# Patient Record
Sex: FEMALE | Race: WHITE | Employment: UNEMPLOYED
[De-identification: names, ages, dates, MRNs, and addresses within clinical notes are randomized per-mention and may not be internally consistent; named-entity substitution may affect disease eponyms.]

---

## 2022-09-07 ENCOUNTER — OFFICE VISIT (OUTPATIENT)
Dept: INTERNAL MEDICINE CLINIC | Facility: CLINIC | Age: 27
End: 2022-09-07

## 2022-09-07 VITALS
HEART RATE: 88 BPM | BODY MASS INDEX: 29.44 KG/M2 | SYSTOLIC BLOOD PRESSURE: 102 MMHG | OXYGEN SATURATION: 99 % | DIASTOLIC BLOOD PRESSURE: 72 MMHG | WEIGHT: 160 LBS | HEIGHT: 62 IN

## 2022-09-07 DIAGNOSIS — K62.5 BRIGHT RED BLOOD PER RECTUM: Primary | ICD-10-CM

## 2022-09-07 DIAGNOSIS — R14.0 BLOATING: ICD-10-CM

## 2022-09-07 LAB
ALBUMIN SERPL-MCNC: 4 G/DL (ref 3.5–5)
ALBUMIN/GLOB SERPL: 1.3 {RATIO} (ref 1.2–3.5)
ALP SERPL-CCNC: 89 U/L (ref 50–136)
ALT SERPL-CCNC: 22 U/L (ref 12–65)
ANION GAP SERPL CALC-SCNC: 2 MMOL/L (ref 4–13)
AST SERPL-CCNC: 6 U/L (ref 15–37)
BILIRUB SERPL-MCNC: 0.4 MG/DL (ref 0.2–1.1)
BUN SERPL-MCNC: 12 MG/DL (ref 6–23)
CALCIUM SERPL-MCNC: 8.9 MG/DL (ref 8.3–10.4)
CHLORIDE SERPL-SCNC: 105 MMOL/L (ref 101–110)
CO2 SERPL-SCNC: 32 MMOL/L (ref 21–32)
CREAT SERPL-MCNC: 0.7 MG/DL (ref 0.6–1)
ERYTHROCYTE [DISTWIDTH] IN BLOOD BY AUTOMATED COUNT: 13.6 % (ref 11.9–14.6)
FERRITIN SERPL-MCNC: 35 NG/ML (ref 8–388)
GLOBULIN SER CALC-MCNC: 3 G/DL (ref 2.3–3.5)
GLUCOSE SERPL-MCNC: 85 MG/DL (ref 65–100)
HCT VFR BLD AUTO: 42 % (ref 35.8–46.3)
HEMOCCULT STL QL: NEGATIVE
HGB BLD-MCNC: 13.2 G/DL (ref 11.7–15.4)
IRON SERPL-MCNC: 57 UG/DL (ref 35–150)
LIPASE SERPL-CCNC: 73 U/L (ref 73–393)
MCH RBC QN AUTO: 27.5 PG (ref 26.1–32.9)
MCHC RBC AUTO-ENTMCNC: 31.4 G/DL (ref 31.4–35)
MCV RBC AUTO: 87.5 FL (ref 79.6–97.8)
NRBC # BLD: 0 K/UL (ref 0–0.2)
PLATELET # BLD AUTO: 290 K/UL (ref 150–450)
PMV BLD AUTO: 10.8 FL (ref 9.4–12.3)
POTASSIUM SERPL-SCNC: 4.3 MMOL/L (ref 3.5–5.1)
PROT SERPL-MCNC: 7 G/DL (ref 6.3–8.2)
RBC # BLD AUTO: 4.8 M/UL (ref 4.05–5.2)
SODIUM SERPL-SCNC: 139 MMOL/L (ref 136–145)
TIBC SERPL-MCNC: 334 UG/DL (ref 250–450)
VALID INTERNAL CONTROL: YES
WBC # BLD AUTO: 10 K/UL (ref 4.3–11.1)

## 2022-09-07 PROCEDURE — 99203 OFFICE O/P NEW LOW 30 MIN: CPT | Performed by: NURSE PRACTITIONER

## 2022-09-07 PROCEDURE — 82272 OCCULT BLD FECES 1-3 TESTS: CPT | Performed by: NURSE PRACTITIONER

## 2022-09-07 ASSESSMENT — PATIENT HEALTH QUESTIONNAIRE - PHQ9
SUM OF ALL RESPONSES TO PHQ9 QUESTIONS 1 & 2: 0
2. FEELING DOWN, DEPRESSED OR HOPELESS: 0
SUM OF ALL RESPONSES TO PHQ QUESTIONS 1-9: 0
SUM OF ALL RESPONSES TO PHQ QUESTIONS 1-9: 0
1. LITTLE INTEREST OR PLEASURE IN DOING THINGS: 0
SUM OF ALL RESPONSES TO PHQ QUESTIONS 1-9: 0
SUM OF ALL RESPONSES TO PHQ QUESTIONS 1-9: 0

## 2022-09-07 ASSESSMENT — ENCOUNTER SYMPTOMS
ABDOMINAL PAIN: 0
DIFFICULTY BREATHING: 0
HEMATOCHEZIA: 1
VOMITING: 0
COUGH: 0
DIARRHEA: 0
NAUSEA: 1
HEMATEMESIS: 0

## 2022-09-07 NOTE — PROGRESS NOTES
Sienna Wyatt (:  1995) is a 28 y.o. female,New patient, here for evaluation of the following chief complaint(s):  New Patient         ASSESSMENT/PLAN:  1. Bright red blood per rectum  -     AMB POC FECAL BLOOD, OCCULT, QL 1 CARD  -     CBC; Future  -     Iron; Future  -     Ferritin; Future  -     Total Iron Binding Capacity; Future  -     [facility] - [facility]  2. Bloating  -     AMB POC FECAL BLOOD, OCCULT, QL 1 CARD  -     Comprehensive Metabolic Panel; Future  -     Lipase; Future  -     [facility] - [facility]    No follow-ups on file. BRBPR, negative hemoccult, no obvious hemorrhoids  Bloating for a year  Refer to gastro  Check lab, iron, lipase  Mild abdominal pain on exam, generalized to entire abdomen    Subjective   SUBJECTIVE/OBJECTIVE:  Patient is here for new patient appt. Last week she had bleeding from the rectum. She had blood with and without bowel movement. No diarrhea. She hasn't had a bm in a day, the last time she went it was a small bm. Her stools are brown but the last time the tip was dark. She has lower back pain. She had bad lower back pain on Saturday. She bled Wednesday, Thursday, Friday, Saturday, and a small amount on . For the past year she has had bloating after eating and some lower back pain. She stopped eating gluten 3 years ago. Rectal Bleeding   The current episode started 5 to 7 days ago. The problem has been resolved. The patient is experiencing no pain. The stool is described as soft. There was no prior successful therapy. There was no prior unsuccessful therapy. Associated symptoms include nausea. Pertinent negatives include no anorexia, no fever, no abdominal pain, no diarrhea, no hematemesis, no hemorrhoids, no vomiting, no hematuria, no vaginal bleeding, no vaginal discharge, no coughing and no difficulty breathing.  Her past medical history does not include abdominal surgery, inflammatory bowel disease, recent antibiotic use, recent change in diet or a recent illness. Review of Systems   Constitutional:  Negative for fever. Respiratory:  Negative for cough. Gastrointestinal:  Positive for hematochezia and nausea. Negative for abdominal pain, anorexia, diarrhea, hematemesis, hemorrhoids and vomiting. Genitourinary:  Negative for hematuria, vaginal bleeding and vaginal discharge. All other systems reviewed and are negative. Objective   Physical Exam  Vitals reviewed. Constitutional:       Appearance: Normal appearance. She is not ill-appearing. HENT:      Head: Normocephalic. Eyes:      Extraocular Movements: Extraocular movements intact. Pupils: Pupils are equal, round, and reactive to light. Cardiovascular:      Rate and Rhythm: Normal rate and regular rhythm. Pulmonary:      Effort: Pulmonary effort is normal.      Breath sounds: Normal breath sounds. Abdominal:      General: Bowel sounds are normal. There is no distension. Palpations: Abdomen is soft. There is no mass. Tenderness: There is abdominal tenderness (generalized). There is no guarding. Musculoskeletal:      Cervical back: Neck supple. Skin:     General: Skin is warm and dry. Coloration: Skin is not jaundiced. Findings: No rash. Neurological:      Mental Status: She is alert. Mental status is at baseline. Psychiatric:         Mood and Affect: Mood normal.         Behavior: Behavior normal.                An electronic signature was used to authenticate this note.     --RICHARD Gordon - CNP